# Patient Record
Sex: MALE | Race: WHITE | NOT HISPANIC OR LATINO | Employment: FULL TIME | URBAN - METROPOLITAN AREA
[De-identification: names, ages, dates, MRNs, and addresses within clinical notes are randomized per-mention and may not be internally consistent; named-entity substitution may affect disease eponyms.]

---

## 2017-11-20 ENCOUNTER — ALLSCRIPTS OFFICE VISIT (OUTPATIENT)
Dept: OTHER | Facility: OTHER | Age: 37
End: 2017-11-20

## 2018-01-15 VITALS
TEMPERATURE: 99 F | RESPIRATION RATE: 18 BRPM | HEIGHT: 75 IN | BODY MASS INDEX: 39.17 KG/M2 | OXYGEN SATURATION: 98 % | SYSTOLIC BLOOD PRESSURE: 154 MMHG | HEART RATE: 88 BPM | WEIGHT: 315 LBS | DIASTOLIC BLOOD PRESSURE: 78 MMHG

## 2018-01-18 NOTE — PROGRESS NOTES
Assessment    1  Chest pain, precordial (786 51) (R07 2)   2  Morbid obesity (278 01) (E66 01)   3  Benign essential hypertension (401 1) (I10)    Plan  Benign essential hypertension    · Drink at least 6 glasses of clear liquids a day ; Status:Complete;   Done: 66YPX0700   · Call [phone] if: You experience a new kind of chest pain (angina) or pressure ;  Status:Complete;   Done: 90FTK7807    Discussion/Summary    #Precordial chest pain: EKG: no acute ST/T wave changes, likely 2/2 GERD, pt  given nexium while in office and declined script for same as he will purchase it OTC  #Dehydration: instructed to hydrate adequately  #Patient also educated on warning signs of ischemic pain and demonstrates understanding  Possible side effects of new medications were reviewed with the patient/guardian today  The treatment plan was reviewed with the patient/guardian  The patient/guardian understands and agrees with the treatment plan      Chief Complaint  pt c/o headache and chest pains/ back pains      History of Present Illness  HPI: 38 yo morbidly obese M with h/o HTN, LENO, and atypical chest pain presents with 2-3 days history of 'shooting pains across chest, feels like jogged 6 miles ' Pain is localized to the left 2/3/4th anterior ribs and intermittent in nature and associated with a pounding headache, SOB & leg swelling (which is baseline for patient)   Aggravated with exertion, lasting 5-10 minutes  Patient has been working since 7609-1560 outdoors and has had 12 oz of soda, 32 oz of snapple, and was unable to drink his coffee as 'kept coming up ' Previously on nexium for GERD, discontinued as insurance would not cover same and pt  would buy it OTC  Review of Systems    Constitutional: no fever, not feeling poorly, no chills and not feeling tired  Cardiovascular: chest pain and lower extremity edema, but no intermittent leg claudication and no palpitations     Respiratory: shortness of breath, but no cough  Gastrointestinal: no abdominal pain, no nausea and no vomiting  Musculoskeletal: no myalgias  Integumentary: no rashes  Neurological: headache, but no numbness, no tingling, no confusion, no dizziness and no fainting  ROS reviewed  Active Problems    1  Acute low back pain with radicular symptoms, duration less than 6 weeks (724 4)   (M54 10)   2  Benign essential hypertension (401 1) (I10)   3  Chest pain, precordial (786 51) (R07 2)   4  Chronic low back pain (724 2,338 29) (M54 5,G89 29)   5  Morbid obesity (278 01) (E66 01)   6  Obstructive sleep apnea (327 23) (G47 33)   7  Pain with urination (788 1) (R30 9)   8  Pre-diabetes (790 29) (R73 09)   9  Sciatica of left side (724 3) (M54 32)   10  Wears glasses (V49 89) (Z97 3)    Past Medical History    1  History of Atypical chest pain (786 59) (R07 89)   2  History of Nervous breakdown (300 9) (F48 8)  Active Problems And Past Medical History Reviewed: The active problems and past medical history were reviewed and updated today  Family History  Father    1  Family history of Coronary artery disease  Family History    2  Family history of diabetes mellitus (V18 0) (Z83 3)   3  Family history of hypertension (V17 49) (Z82 49)   4  Family history of Kidney failure  Family History Reviewed: The family history was reviewed and updated today  Social History    · Current every day smoker (305 1) (F17 200)   · Lives with parents   · No alcohol use   · Single parent (V61 8)  The social history was reviewed and updated today  The social history was reviewed and is unchanged  Surgical History    1  History of Cholecystectomy   2  History of Hernia Repair  Surgical History Reviewed: The surgical history was reviewed and updated today  Current Meds   1  Diclofenac Sodium 3 % Transdermal Gel; APPLY SPARINGLY TO THE AFFECTED   AREA(S) TWICE DAILY;    Therapy: 01NOY7936 to (Last Rx:36Jgx8195)  Requested for: 90Lve9214 Ordered 2  Gabapentin 600 MG Oral Tablet; TAKE ONE TABLET BY MOUTH THREE TIMES DAILY   AS NEEDED FOR PAIN;   Therapy: 29UVB9371 to (Evaluate:71Iwp1332)  Requested for: 22Mar2016; Last   Rx:22Mar2016 Ordered   3  Valsartan-Hydrochlorothiazide 320-25 MG Oral Tablet; TAKE 1 TABLET DAILY; Therapy: 98NNU5597 to (Evaluate:13Mar2017)  Requested for: 83KHW7896; Last   Rx:18Mar2016 Ordered    The medication list was reviewed and updated today  Allergies    1  Ibuprofen TABS   2  Motrin TABS   3  SEROquel TABS   4  Toprol XL TB24    Vitals   Recorded: 07Jun2016 04:02PM   Temperature 98 F   Heart Rate 101   Respiration 20   Systolic 765   Diastolic 88   O2 Saturation 95     Physical Exam    Constitutional   General appearance: Abnormal   uncomfortable, morbidly obese, appears tired and poorly hydrated  Eyes   Conjunctiva and lids: No swelling, erythema, or discharge  Pupils and irises: Equal, round and reactive to light  Ears, Nose, Mouth, and Throat   Oropharynx: Normal with no erythema, edema, exudate or lesions  Pulmonary   Respiratory effort: No increased work of breathing or signs of respiratory distress  Auscultation of lungs: Clear to auscultation, equal breath sounds bilaterally, no wheezes, no rales, no rhonci  Cardiovascular   Palpation of heart: Normal PMI, no thrills  Auscultation of heart: Normal rate and rhythm, normal S1 and S2, without murmurs  Examination of extremities for edema and/or varicosities: Abnormal   2(+) pitting edema b/l LE  Carotid pulses: Normal     Abdomen   Abdomen: Abnormal   The abdomen was obese  Liver and spleen: No hepatomegaly or splenomegaly  Lymphatic   Palpation of lymph nodes in neck: No lymphadenopathy  Skin   Skin and subcutaneous tissue: Normal without rashes or lesions  Psychiatric   Orientation to person, place and time: Normal     Mood and affect: Normal          Results/Data  No acute ischemia  Rhythm and rate: normal sinus rhythm  P-waves: the P wave is normal    ST segment: the ST segments are normal    T waves: normal    Comparison to prior ECGs:  no prior ECGs were available for comparison  Attending Note  Attending Note Sergey Ceron: Attending Note: I discussed the case with the Resident and reviewed the Resident's note and I agree with the Resident management plan as it was presented to me  Signatures   Electronically signed by : MARTITA Blanton ; Jun 7 2016  5:15PM EST                       (Author)    Electronically signed by :  SARI Dykes ; Jun 16 2016 12:22PM EST                       (Author)

## 2018-01-23 NOTE — CONSULTS
Chief Complaint  patient here for clearance for surgery      History of Present Illness  Pre-Op Visit (Brief): The patient is being seen for a preoperative visit and gastric bypass surgery  Surgical Risk Assessment:   Prior Anesthesia: He had prior anesthesia and no prior adverse reaction to general anesthesia  Pertinent Past Medical History: Obstructive sleep apnea, morbid obesity, HTN  Symptoms: no easy bleeding, no easy bruising, no chest pain, no cough and no wheezing  Pertinent Family History: CAD Father  Living Situation: home is secure and supportive and no post-op concerns with his living situation  HPI: Prior hx of bipolar disorder, no meds for 5 years  Out of work from 3960-2878 because of bipolar  Hx of 302 due to bipolar and stopped taking all meds after inpatient hospitalization  Review of Systems    Constitutional: feeling tired, but no fever, not feeling poorly and no chills  Eyes: no eye pain  ENT: no hearing loss  Cardiovascular: no chest pain and no palpitations  Respiratory: shortness of breath during exertion, but no cough and no wheezing  Gastrointestinal: no constipation  Musculoskeletal: no arthralgias and no myalgias  Integumentary: no rashes  Active Problems    1  Benign essential hypertension (401 1) (I10)   2  Chronic low back pain (724 2,338 29) (M54 5,G89 29)   3  Morbid obesity (278 01) (E66 01)   4  Obstructive sleep apnea (327 23) (G47 33)   5  Pre-diabetes (790 29) (R73 03)   6  Sciatica of left side (724 3) (M54 32)   7  Wears glasses (V49 89) (Z97 3)    Past Medical History    · History of Atypical chest pain (786 59) (R07 89)   · History of Nervous breakdown (300 9) (F48 8)    The active problems and past medical history were reviewed and updated today  Surgical History    · History of Cholecystectomy   · History of Hernia Repair    The surgical history was reviewed and updated today         Family History    · Family history of Coronary artery disease    · Family history of diabetes mellitus (V18 0) (Z83 3)   · Family history of hypertension (V17 49) (Z82 49)   · Family history of Kidney failure    The family history was reviewed and updated today  Social History    · Current every day smoker (305 1) (F17 200)   · Lives with parents   · No alcohol use   · Single parent (V61 8)  The social history was reviewed and updated today  The social history was reviewed and is unchanged  Current Meds   1  Gabapentin 600 MG Oral Tablet; TAKE ONE TABLET BY MOUTH THREE TIMES DAILY AS   NEEDED FOR PAIN;   Therapy: 03FRX7782 to (Evaluate:19Mar2018)  Requested for: 09CFX4783; Last   Rx:20Sep2017 Ordered   2  Valsartan-Hydrochlorothiazide 320-25 MG Oral Tablet; TAKE ONE TABLET BY MOUTH   ONCE DAILY  PATIENT NEEDS APPT AT [facility] BEFORE ANY MORE REFILLS; Therapy: 33LNJ5981 to [phone])  Requested for: 20Sep2017; Last   Rx:20Sep2017 Ordered    The medication list was reviewed and updated today  Allergies    1  Ibuprofen TABS   2  Motrin TABS   3  SEROquel TABS   4  Toprol XL TB24    Vitals  Signs    Temperature: 99 F  Heart Rate: 88  Respiration: 18  Systolic: 772  Diastolic: 78  Height: 6 ft 3 in  Weight: 408 lb   BMI Calculated: 51  BSA Calculated: 2 97  O2 Saturation: 98  Pain Scale: 0    Physical Exam    Constitutional   General appearance: No acute distress, well appearing and well nourished  Head and Face   Head and face: Normal     Eyes   Conjunctiva and lids: No erythema, swelling or discharge  Pupils and irises: Equal, round, reactive to light  Ears, Nose, Mouth, and Throat   External inspection of ears and nose: Normal     Otoscopic examination: Tympanic membranes translucent with normal light reflex  Canals patent without erythema  Oropharynx: Normal with no erythema, edema, exudate or lesions  Neck   Neck: Supple, symmetric, trachea midline, no masses      Pulmonary   Respiratory effort: No increased work of breathing or signs of respiratory distress  Auscultation of lungs: Clear to auscultation  Cardiovascular   Auscultation of heart: Normal rate and rhythm, normal S1 and S2, no murmurs  Examination of extremities for edema and/or varicosities: Normal     Abdomen   Abdomen: Non-tender, no masses  Liver and spleen: No hepatomegaly or splenomegaly  Lymphatic   Palpation of lymph nodes in neck: No lymphadenopathy  Musculoskeletal   Gait and station: Normal     Skin grease stained  Neurologic grossly intact  Results/Data  *VB-Depression Screening 02SBV7466 01:24PM Evertt Hole     Test Name Result Flag Reference   Depression Scale Result      Depression Screen - Negative For Symptoms     PHQ-2 Adult Depression Screening 20Nov2017 01:23PM User, Lui     Test Name Result Flag Reference   PHQ-2 Adult Depression Score 0     Over the last two weeks, how often have you been bothered by any of the following problems? Little interest or pleasure in doing things: Not at all - 0  Feeling down, depressed, or hopeless: Not at all - 0   PHQ-2 Adult Depression Screening Negative         Assessment    1  Depression screen (V79 0) (Z13 89)   2  Influenza vaccination declined (V64 06) (Z28 21)   3  Preop examination (V72 84) (Z01 818)   4  Benign essential hypertension (401 1) (I10)   5  Obstructive sleep apnea (327 23) (G47 33)   6  Pre-diabetes (790 29) (R73 03)    Plan  Benign essential hypertension    · (LC) CBC, No Differential/Platelet; Status:Active; Requested for:20Nov2017;    · ([facility]) Comp  Metabolic Panel (12); Status:Active; Requested for:20Nov2017;    · ([facility]) Lipid Panel; Status:Active; Requested for:20Nov2017;   Depression screen    · *VB-Depression Screening; Status:Complete;   Done: 89IEA2055 01:24PM  Influenza vaccination declined    · Stop: Influenza  Pre-diabetes    · (LC) Hemoglobin A1c; Status:Active; Requested for:20Nov2017;    · (LC) TSH Rfx on Abnormal to Free T4; Status:Active;  Requested SRD:53YDI1314;   Preop examination    · 1 Mary Anne Brian MD, [address] [place] [address]  Cardiology Co-Management  *  Status: Active  Requested for:  92XDQ3472  Care Summary provided  : Yes    Discussion/Summary  Surgical Clearance: He is at a MODERATE risk from a cardiovascular standpoint at this time without any additional cardiac testing  Reevaluation needed, if he should present with symptoms prior to surgery/procedure  Comments:  Patient is not cleared for surgery until evaluated by a cardiologist due to obesity, prior episodes of chest pain, and family history  Gave referral to cardiologist today  End of Encounter Meds    1  Valsartan-Hydrochlorothiazide 320-25 MG Oral Tablet; TAKE ONE TABLET BY MOUTH   ONCE DAILY  PATIENT NEEDS APPT AT [facility] BEFORE ANY MORE REFILLS; Therapy: 88HCG1509 to (Evaluate:19Mar2018)  Requested for: 73TBW9251; Last   Rx:97Vkp9220 Ordered    2   Gabapentin 600 MG Oral Tablet; TAKE ONE TABLET BY MOUTH THREE TIMES DAILY AS   NEEDED FOR PAIN;   Therapy: 09LZE0960 to (Peggy Ginger)  Requested for: 61APW3254; Last   Rx:83Rfr0309 Ordered    Signatures   Electronically signed by : MARTITA Villanueva ; Dec  8 2017  2:11PM EST                       (Author)

## 2018-03-19 DIAGNOSIS — R52 PAIN: Primary | ICD-10-CM

## 2018-03-19 DIAGNOSIS — G89.29 CHRONIC LOW BACK PAIN WITH SCIATICA, SCIATICA LATERALITY UNSPECIFIED, UNSPECIFIED BACK PAIN LATERALITY: ICD-10-CM

## 2018-03-19 DIAGNOSIS — M54.40 CHRONIC LOW BACK PAIN WITH SCIATICA, SCIATICA LATERALITY UNSPECIFIED, UNSPECIFIED BACK PAIN LATERALITY: ICD-10-CM

## 2018-03-19 RX ORDER — GABAPENTIN 600 MG/1
TABLET ORAL
Qty: 90 TABLET | Refills: 3 | Status: SHIPPED | OUTPATIENT
Start: 2018-03-19 | End: 2018-07-31 | Stop reason: SDUPTHER

## 2018-04-17 DIAGNOSIS — I10 ESSENTIAL HYPERTENSION: Primary | ICD-10-CM

## 2018-04-20 RX ORDER — VALSARTAN AND HYDROCHLOROTHIAZIDE 320; 25 MG/1; MG/1
TABLET, FILM COATED ORAL
Qty: 90 TABLET | Refills: 1 | Status: SHIPPED | OUTPATIENT
Start: 2018-04-20 | End: 2018-07-31 | Stop reason: ALTCHOICE

## 2018-07-31 ENCOUNTER — OFFICE VISIT (OUTPATIENT)
Dept: FAMILY MEDICINE CLINIC | Facility: CLINIC | Age: 38
End: 2018-07-31
Payer: COMMERCIAL

## 2018-07-31 VITALS
HEART RATE: 85 BPM | BODY MASS INDEX: 51.87 KG/M2 | SYSTOLIC BLOOD PRESSURE: 164 MMHG | TEMPERATURE: 98.6 F | OXYGEN SATURATION: 96 % | WEIGHT: 315 LBS | DIASTOLIC BLOOD PRESSURE: 94 MMHG

## 2018-07-31 DIAGNOSIS — M54.40 CHRONIC LOW BACK PAIN WITH SCIATICA, SCIATICA LATERALITY UNSPECIFIED, UNSPECIFIED BACK PAIN LATERALITY: ICD-10-CM

## 2018-07-31 DIAGNOSIS — H60.332 ACUTE SWIMMER'S EAR OF LEFT SIDE: Primary | ICD-10-CM

## 2018-07-31 DIAGNOSIS — G89.29 CHRONIC LOW BACK PAIN WITH SCIATICA, SCIATICA LATERALITY UNSPECIFIED, UNSPECIFIED BACK PAIN LATERALITY: ICD-10-CM

## 2018-07-31 PROCEDURE — 99213 OFFICE O/P EST LOW 20 MIN: CPT | Performed by: FAMILY MEDICINE

## 2018-07-31 RX ORDER — NEOMYCIN SULFATE, POLYMYXIN B SULFATE AND HYDROCORTISONE 10; 3.5; 1 MG/ML; MG/ML; [USP'U]/ML
4 SUSPENSION/ DROPS AURICULAR (OTIC) 4 TIMES DAILY
Qty: 10 ML | Refills: 1 | Status: SHIPPED | OUTPATIENT
Start: 2018-07-31 | End: 2018-08-07

## 2018-07-31 RX ORDER — GABAPENTIN 600 MG/1
600 TABLET ORAL 3 TIMES DAILY PRN
Qty: 90 TABLET | Refills: 5 | Status: SHIPPED | OUTPATIENT
Start: 2018-07-31 | End: 2019-02-07 | Stop reason: SDUPTHER

## 2018-07-31 NOTE — PROGRESS NOTES
Assessment/Plan:    Will treat with cortisporin  Instructions  Will renew gabapentin  Make appmt for physical     Diagnoses and all orders for this visit:    Acute swimmer's ear of left side  -     neomycin-polymyxin-hydrocortisone (CORTISPORIN) 0 35%-10,000 units/mL-1% otic suspension; Administer 4 drops into the left ear 4 (four) times a day for 7 days    Chronic low back pain with sciatica, sciatica laterality unspecified, unspecified back pain laterality  -     gabapentin (NEURONTIN) 600 MG tablet; Take 1 tablet (600 mg total) by mouth 3 (three) times a day as needed (back pain)        Subjective:      Patient ID: Regina Davis is a 45 y o  male  Patient has noted pain left ear with some d/c  Over past several days  Has a hx recurrent swimmers ear  Has received drops in the past  No fever    Also needs gabapentin refilled for chronic back pain  No other new issues        The following portions of the patient's history were reviewed and updated as appropriate: past family history, past medical history, past social history and past surgical history  Review of Systems   Constitutional: Negative  HENT: Positive for ear discharge and ear pain  Eyes: Negative  Respiratory: Negative  Cardiovascular: Negative  Gastrointestinal: Negative  Musculoskeletal:        See hpi   Neurological: Negative  Psychiatric/Behavioral: Negative  Objective:      /94   Pulse 85   Temp 98 6 °F (37 °C)   Wt (!) 188 kg (415 lb)   SpO2 96%   BMI 51 87 kg/m²          Physical Exam   Constitutional: He appears well-developed and well-nourished  HENT:   Head: Normocephalic and atraumatic  Right Ear: External ear normal    Left ear tender  Left canal erythematous with some swelling and d/c   Eyes: Conjunctivae and EOM are normal  Pupils are equal, round, and reactive to light  Neck: Neck supple  Cardiovascular: Normal rate, regular rhythm and normal heart sounds      Pulmonary/Chest: Effort normal and breath sounds normal

## 2018-08-21 DIAGNOSIS — I10 ESSENTIAL HYPERTENSION: ICD-10-CM

## 2018-08-27 ENCOUNTER — TELEPHONE (OUTPATIENT)
Dept: FAMILY MEDICINE CLINIC | Facility: CLINIC | Age: 38
End: 2018-08-27

## 2018-08-27 DIAGNOSIS — I10 ESSENTIAL HYPERTENSION: Primary | Chronic | ICD-10-CM

## 2018-08-27 RX ORDER — LOSARTAN POTASSIUM AND HYDROCHLOROTHIAZIDE 12.5; 1 MG/1; MG/1
1 TABLET ORAL DAILY
Qty: 90 TABLET | Refills: 2 | Status: SHIPPED | OUTPATIENT
Start: 2018-08-27 | End: 2019-10-29 | Stop reason: SDUPTHER

## 2018-08-27 RX ORDER — VALSARTAN AND HYDROCHLOROTHIAZIDE 320; 25 MG/1; MG/1
TABLET, FILM COATED ORAL
Qty: 90 TABLET | Refills: 1 | OUTPATIENT
Start: 2018-08-27

## 2018-08-27 NOTE — TELEPHONE ENCOUNTER
DR Tuan Javier - PT WAS ON LOSARTAN  IT HAS BEEN RECALLED  HE DOESN'T HAVE ANY BP MEDICATION NOW  HE NEEDS SOMETHING CALLED INTO  [facility] [address]  HE WOULD LIKE TO HAVE THIS TODAY  HE WOULD LIKE CALL BACK

## 2019-02-04 DIAGNOSIS — G89.29 CHRONIC LOW BACK PAIN WITH SCIATICA, SCIATICA LATERALITY UNSPECIFIED, UNSPECIFIED BACK PAIN LATERALITY: ICD-10-CM

## 2019-02-04 DIAGNOSIS — M54.40 CHRONIC LOW BACK PAIN WITH SCIATICA, SCIATICA LATERALITY UNSPECIFIED, UNSPECIFIED BACK PAIN LATERALITY: ICD-10-CM

## 2019-02-07 DIAGNOSIS — M54.40 CHRONIC LOW BACK PAIN WITH SCIATICA, SCIATICA LATERALITY UNSPECIFIED, UNSPECIFIED BACK PAIN LATERALITY: ICD-10-CM

## 2019-02-07 DIAGNOSIS — G89.29 CHRONIC LOW BACK PAIN WITH SCIATICA, SCIATICA LATERALITY UNSPECIFIED, UNSPECIFIED BACK PAIN LATERALITY: ICD-10-CM

## 2019-02-07 RX ORDER — GABAPENTIN 600 MG/1
600 TABLET ORAL 3 TIMES DAILY PRN
Qty: 90 TABLET | Refills: 5 | Status: SHIPPED | OUTPATIENT
Start: 2019-02-07

## 2019-03-27 RX ORDER — GABAPENTIN 600 MG/1
TABLET ORAL
Qty: 90 TABLET | Refills: 5 | Status: SHIPPED | OUTPATIENT
Start: 2019-03-27 | End: 2019-10-01 | Stop reason: SDUPTHER

## 2019-10-01 DIAGNOSIS — G89.29 CHRONIC LOW BACK PAIN WITH SCIATICA, SCIATICA LATERALITY UNSPECIFIED, UNSPECIFIED BACK PAIN LATERALITY: ICD-10-CM

## 2019-10-01 DIAGNOSIS — M54.40 CHRONIC LOW BACK PAIN WITH SCIATICA, SCIATICA LATERALITY UNSPECIFIED, UNSPECIFIED BACK PAIN LATERALITY: ICD-10-CM

## 2019-10-01 RX ORDER — GABAPENTIN 600 MG/1
TABLET ORAL
Qty: 90 TABLET | Refills: 5 | Status: SHIPPED | OUTPATIENT
Start: 2019-10-01 | End: 2020-04-01

## 2019-10-29 DIAGNOSIS — I10 ESSENTIAL HYPERTENSION: Chronic | ICD-10-CM

## 2019-10-29 RX ORDER — LOSARTAN POTASSIUM AND HYDROCHLOROTHIAZIDE 12.5; 1 MG/1; MG/1
TABLET ORAL
Qty: 90 TABLET | Refills: 2 | Status: SHIPPED | OUTPATIENT
Start: 2019-10-29 | End: 2020-11-17 | Stop reason: SDUPTHER

## 2020-04-01 DIAGNOSIS — G89.29 CHRONIC LOW BACK PAIN WITH SCIATICA, SCIATICA LATERALITY UNSPECIFIED, UNSPECIFIED BACK PAIN LATERALITY: ICD-10-CM

## 2020-04-01 DIAGNOSIS — M54.40 CHRONIC LOW BACK PAIN WITH SCIATICA, SCIATICA LATERALITY UNSPECIFIED, UNSPECIFIED BACK PAIN LATERALITY: ICD-10-CM

## 2020-04-01 RX ORDER — GABAPENTIN 600 MG/1
TABLET ORAL
Qty: 90 TABLET | Refills: 0 | Status: SHIPPED | OUTPATIENT
Start: 2020-04-01 | End: 2020-05-05

## 2020-05-05 DIAGNOSIS — G89.29 CHRONIC LOW BACK PAIN WITH SCIATICA, SCIATICA LATERALITY UNSPECIFIED, UNSPECIFIED BACK PAIN LATERALITY: ICD-10-CM

## 2020-05-05 DIAGNOSIS — M54.40 CHRONIC LOW BACK PAIN WITH SCIATICA, SCIATICA LATERALITY UNSPECIFIED, UNSPECIFIED BACK PAIN LATERALITY: ICD-10-CM

## 2020-05-05 RX ORDER — GABAPENTIN 600 MG/1
TABLET ORAL
Qty: 90 TABLET | Refills: 0 | Status: SHIPPED | OUTPATIENT
Start: 2020-05-05 | End: 2020-06-15

## 2020-06-15 DIAGNOSIS — G89.29 CHRONIC LOW BACK PAIN WITH SCIATICA, SCIATICA LATERALITY UNSPECIFIED, UNSPECIFIED BACK PAIN LATERALITY: ICD-10-CM

## 2020-06-15 DIAGNOSIS — M54.40 CHRONIC LOW BACK PAIN WITH SCIATICA, SCIATICA LATERALITY UNSPECIFIED, UNSPECIFIED BACK PAIN LATERALITY: ICD-10-CM

## 2020-06-15 RX ORDER — GABAPENTIN 600 MG/1
TABLET ORAL
Qty: 90 TABLET | Refills: 5 | Status: SHIPPED | OUTPATIENT
Start: 2020-06-15 | End: 2021-01-05

## 2020-11-17 DIAGNOSIS — I10 ESSENTIAL HYPERTENSION: Chronic | ICD-10-CM

## 2020-11-17 RX ORDER — LOSARTAN POTASSIUM AND HYDROCHLOROTHIAZIDE 12.5; 1 MG/1; MG/1
1 TABLET ORAL DAILY
Qty: 90 TABLET | Refills: 2 | Status: SHIPPED | OUTPATIENT
Start: 2020-11-17

## 2020-12-30 DIAGNOSIS — M54.40 CHRONIC LOW BACK PAIN WITH SCIATICA, SCIATICA LATERALITY UNSPECIFIED, UNSPECIFIED BACK PAIN LATERALITY: ICD-10-CM

## 2020-12-30 DIAGNOSIS — G89.29 CHRONIC LOW BACK PAIN WITH SCIATICA, SCIATICA LATERALITY UNSPECIFIED, UNSPECIFIED BACK PAIN LATERALITY: ICD-10-CM

## 2020-12-30 RX ORDER — GABAPENTIN 600 MG/1
TABLET ORAL
Qty: 90 TABLET | Refills: 0 | OUTPATIENT
Start: 2020-12-30

## 2021-01-05 DIAGNOSIS — G89.29 CHRONIC LOW BACK PAIN WITH SCIATICA, SCIATICA LATERALITY UNSPECIFIED, UNSPECIFIED BACK PAIN LATERALITY: ICD-10-CM

## 2021-01-05 DIAGNOSIS — M54.40 CHRONIC LOW BACK PAIN WITH SCIATICA, SCIATICA LATERALITY UNSPECIFIED, UNSPECIFIED BACK PAIN LATERALITY: ICD-10-CM

## 2021-01-05 RX ORDER — GABAPENTIN 600 MG/1
TABLET ORAL
Qty: 90 TABLET | Refills: 0 | Status: SHIPPED | OUTPATIENT
Start: 2021-01-05

## 2021-03-24 DIAGNOSIS — G89.29 CHRONIC LOW BACK PAIN WITH SCIATICA, SCIATICA LATERALITY UNSPECIFIED, UNSPECIFIED BACK PAIN LATERALITY: ICD-10-CM

## 2021-03-24 DIAGNOSIS — M54.40 CHRONIC LOW BACK PAIN WITH SCIATICA, SCIATICA LATERALITY UNSPECIFIED, UNSPECIFIED BACK PAIN LATERALITY: ICD-10-CM

## 2021-03-24 RX ORDER — GABAPENTIN 600 MG/1
TABLET ORAL
Qty: 90 TABLET | Refills: 0 | OUTPATIENT
Start: 2021-03-24

## 2021-03-31 ENCOUNTER — TRANSCRIBE ORDERS (OUTPATIENT)
Dept: ADMINISTRATIVE | Facility: HOSPITAL | Age: 41
End: 2021-03-31

## 2021-03-31 DIAGNOSIS — S83.002A UNSPECIFIED SUBLUXATION OF LEFT PATELLA, INITIAL ENCOUNTER: Primary | ICD-10-CM

## 2021-03-31 DIAGNOSIS — M25.562 PAIN IN LEFT KNEE: ICD-10-CM

## 2021-03-31 DIAGNOSIS — M25.362 OTHER INSTABILITY, LEFT KNEE: ICD-10-CM

## 2021-05-07 VITALS
SYSTOLIC BLOOD PRESSURE: 113 MMHG | WEIGHT: 315 LBS | HEART RATE: 81 BPM | DIASTOLIC BLOOD PRESSURE: 71 MMHG | HEIGHT: 75 IN | BODY MASS INDEX: 39.17 KG/M2

## 2021-05-07 DIAGNOSIS — Z01.812 ENCOUNTER FOR PRE-OPERATIVE LABORATORY TESTING: ICD-10-CM

## 2021-05-07 DIAGNOSIS — S83.252A BUCKET HANDLE TEAR OF LATERAL MENISCUS OF LEFT KNEE, UNSPECIFIED WHETHER OLD OR CURRENT TEAR, INITIAL ENCOUNTER: Primary | ICD-10-CM

## 2021-05-07 PROCEDURE — 99204 OFFICE O/P NEW MOD 45 MIN: CPT | Performed by: ORTHOPAEDIC SURGERY

## 2021-05-07 NOTE — PROGRESS NOTES
Assessment/Plan:  1  Bucket handle tear of lateral meniscus of left knee, unspecified whether old or current tear, initial encounter  Case request operating room: MENISCECTOMY LATERAL (LEFT)    PAT Covid Screening    Case request operating room: MENISCECTOMY LATERAL (LEFT)   2  Encounter for pre-operative laboratory testing  PAT Covid Screening       Scribe Attestation    I,:  Josue Rodríguez am acting as a scribe while in the presence of the attending physician :       I,:  Mariah Altman MD personally performed the services described in this documentation    as scribed in my presence :             India Rogel has a large bucket-handle tear of the lateral meniscus of the left knee that is causing mechanical symptoms and pain  I do feel he is a candidate for left knee arthroscopy lateral meniscectomy  I did review the notes from Dr Liz Zaragoza today who is his previous orthopedic surgeon and was going to do his arthroscopic meniscectomy  We discussed the surgery in great detail  We also discussed the recovery  Risk of the surgery are inclusive of but not limited to bleeding, infection, nerve injury, blood clot, worsening of symptoms, not achieving the anticipated results, persistent stiffness, weakness and the need for additional surgery  The patient verbally stated he understood those risks and would like to proceed with the surgery  A written informed consent was given today in the office  I did review recent testing reports which were provided today from his cardiologist that included blood work as well as an ECG  His recent blood work does show an elevated white blood cell count of 19  He states this has been managed and has been worked up by other providers  He states he has had an elevated white blood cell count since the age of 16  Most recent ECG found no significant change compared to a study on February 14, 2021  There is a normal sinus rhythm and inferior infarct    I did ask the patient to have his cardiologists fax over a clearance note prior to surgery  His wife and patient verbally stated they understood  I did provide them a fax number directly to our office  The patient will require a negative COVID test and minimum of 6 days prior to his scheduled surgery  Patient was fitted for crutches today which he may need to use following his knee surgery  The patient is an everyday smoker  I encouraged him in quitting due to his overall health concerns  Next time I see him will be in surgery  Subjective:   Matti Montoya is a 36 y o  male who presents to the office today for evaluation of his left knee  Willa Miller states that on or around February 1st he twisted the left knee 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 experience a painful pop in the lateral aspect of the left knee  He states this is happen often in the past he is able to flex and extend the knee and something will pop back into place  Unfortunately that has not occurred on this most recent incident  His chief complaint is of a persistent pain in the lateral aspect of the left knee that will radiate anteriorly  Bearing weight on a fully extended knee will cause his pain to be sharp and severe  He is better at rest and with the use of Kinesio tape and Ace wrap wrapping  He had been treated previously by Dr Son Tracy of the [facility] and was scheduled to have surgery today  A left knee arthroscopy lateral meniscectomy had been originally scheduled unfortunately [employer] insurance no longer covers Dr Bert Goldmann services  Rohitlo Paul did have an MRI of the left knee on April 8, 2021 which demonstrates a large bucket-handle tear of the lateral meniscus  Pertinent history includes a history of a myocardial infarction suffered on February 14, 2021 which required a cardiac stent  He is on 81 mg of aspirin daily as well as Plavix   He is currently under the care of a cardiologist   He has had recent blood work as well as clearance from cardiology for his surgery  Review of Systems   Constitutional: Negative for chills, fever and unexpected weight change  HENT: Negative for hearing loss, nosebleeds and sore throat  Eyes: Negative for pain, redness and visual disturbance  Respiratory: Negative for cough, shortness of breath and wheezing  Cardiovascular: Negative for chest pain, palpitations and leg swelling  Gastrointestinal: Negative for abdominal pain, nausea and vomiting  Endocrine: Negative for polyphagia and polyuria  Genitourinary: Negative for dysuria and hematuria  Musculoskeletal:        See HPI   Skin: Negative for rash and wound  Neurological: Negative for dizziness, numbness and headaches  Psychiatric/Behavioral: Negative for decreased concentration and suicidal ideas  The patient is not nervous/anxious            Past Medical History:   Diagnosis Date    Hypertension        Past Surgical History:   Procedure Laterality Date    CHOLECYSTECTOMY  2008    HERNIA REPAIR      TONSILLECTOMY         Family History   Problem Relation Age of Onset    Hypertension Family     Obesity Family        Social History     Occupational History    Not on file   Tobacco Use    Smoking status: Current Every Day Smoker     Packs/day: 1 00    Smokeless tobacco: Never Used    Tobacco comment: Per [employer]: Heavy Tabacco smoker   Substance and Sexual Activity    Alcohol use: No    Drug use: Never    Sexual activity: Not on file         Current Outpatient Medications:     esomeprazole (NexIUM) 20 mg capsule, Take 20 mg by mouth every morning before breakfast , Disp: , Rfl:     gabapentin (NEURONTIN) 600 MG tablet, Take 1 tablet (600 mg total) by mouth 3 (three) times a day as needed (back pain), Disp: 90 tablet, Rfl: 5    gabapentin (NEURONTIN) 600 MG tablet, TAKE 1 TABLET BY MOUTH THREE TIMES DAILY AS NEEDED FOR BACK PAIN, Disp: 90 tablet, Rfl: 0    losartan-hydrochlorothiazide (HYZAAR) 100-12 5 MG per tablet, Take 1 tablet by mouth daily, Disp: 90 tablet, Rfl: 2    neomycin-polymyxin-hydrocortisone (CORTISPORIN) 0 35%-10,000 units/mL-1% otic suspension, Administer 4 drops into the left ear 4 (four) times a day for 7 days, Disp: 10 mL, Rfl: 1    Allergies   Allergen Reactions    Aspirin     Ibuprofen     Seroquel [Quetiapine Fumarate] Other (See Comments)     Shaking, anxiety, weight gain    Ticagrelor GI Bleeding     Rectal bleeding    Toprol Xl [Metoprolol Tartrate]        Objective:  Vitals:    05/07/21 1131   BP: 113/71   Pulse: 81       Left Knee Exam     Tenderness   The patient is experiencing tenderness in the MCL and lateral joint line  Range of Motion   Extension: 0   Flexion: 100     Tests   Jose Antonio:  Medial - negative Lateral - positive  Varus: negative Valgus: negative  Drawer:  Anterior - negative     Posterior - negative    Other   Sensation: normal  Swelling: mild  Effusion: no effusion present          Observations   Left Knee   Negative for effusion  Physical Exam  Vitals signs reviewed  Constitutional:       Appearance: He is well-developed  HENT:      Head: Normocephalic and atraumatic  Eyes:      General:         Right eye: No discharge  Left eye: No discharge  Conjunctiva/sclera: Conjunctivae normal    Neck:      Musculoskeletal: Normal range of motion and neck supple  Cardiovascular:      Rate and Rhythm: Regular rhythm  Heart sounds: Normal heart sounds  Pulmonary:      Effort: Pulmonary effort is normal  No respiratory distress  Breath sounds: Normal breath sounds  Musculoskeletal:      Left knee: He exhibits no effusion  Skin:     General: Skin is warm and dry  Neurological:      Mental Status: He is alert and oriented to person, place, and time     Psychiatric:         Behavior: Behavior normal          I have personally reviewed pertinent films in PACS and my interpretation is as follows:  MRI of the left knee from an outside source and ordered by Dr Stalin Brantley id performed on April 8, 2021 was reviewed and found to demonstrate a large bucket-handle tear to the lateral meniscus

## 2021-05-20 NOTE — TELEPHONE ENCOUNTER
I relayed this to Beryle Cairo' wife  She understands safety first, however, she is asking what he can do in the interim until he can have surgery? He's currently taking tylenol, gabapentin, wrapping it, icing, using Kinesio tape  Any other suggestions? She did say that she spoke with Dr Karen Saunders office and was told that it will mostly likely be 6 months to a year before he would be able to hold any of these meds

## 2021-05-20 NOTE — TELEPHONE ENCOUNTER
Received clearance form back from Dr Yancy Wiley (Cardio)  States that he is cleared at Intermediate Risk  Also states:  CANNOT HOLD ANY MEDS RECENT MI  Plavix  Aspirin  Brilinta    Please advise if okay to proceed with surgery    Surgery scheduled 5/27

## 2021-06-01 NOTE — TELEPHONE ENCOUNTER
Patients spouse Boyd Yo is calling in wanting to speak with someone relating his surgery she is asking if someone can reach out to her at [phone]

## 2021-09-28 ENCOUNTER — TELEPHONE (OUTPATIENT)
Dept: FAMILY MEDICINE CLINIC | Facility: CLINIC | Age: 41
End: 2021-09-28

## 2022-10-07 ENCOUNTER — HOSPITAL ENCOUNTER (OUTPATIENT)
Dept: MRI IMAGING | Facility: HOSPITAL | Age: 42
End: 2022-10-07
Payer: COMMERCIAL

## 2022-10-07 DIAGNOSIS — M54.16 LEFT LUMBAR RADICULOPATHY: ICD-10-CM

## 2022-10-07 PROCEDURE — G1004 CDSM NDSC: HCPCS

## 2022-10-07 PROCEDURE — 72148 MRI LUMBAR SPINE W/O DYE: CPT
